# Patient Record
Sex: FEMALE | Race: WHITE | Employment: UNEMPLOYED | URBAN - NONMETROPOLITAN AREA
[De-identification: names, ages, dates, MRNs, and addresses within clinical notes are randomized per-mention and may not be internally consistent; named-entity substitution may affect disease eponyms.]

---

## 2021-05-30 ENCOUNTER — HOSPITAL ENCOUNTER (EMERGENCY)
Age: 61
Discharge: HOME | End: 2021-05-30
Attending: FAMILY MEDICINE
Payer: COMMERCIAL

## 2021-05-30 VITALS
WEIGHT: 158 LBS | RESPIRATION RATE: 16 BRPM | DIASTOLIC BLOOD PRESSURE: 80 MMHG | HEIGHT: 66 IN | HEART RATE: 95 BPM | OXYGEN SATURATION: 97 % | BODY MASS INDEX: 25.39 KG/M2 | TEMPERATURE: 98.1 F | SYSTOLIC BLOOD PRESSURE: 125 MMHG
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
## 2021-05-30 PROCEDURE — 99282 EMERGENCY DEPT VISIT SF MDM: CPT

## 2021-05-30 RX ORDER — GLUCOSAMINE SULFATE 500 MG
2 CAPSULE ORAL DAILY
COMMUNITY

## 2021-05-30 RX ORDER — M-VIT,TX,IRON,MINS/CALC/FOLIC 27MG-0.4MG
1 TABLET ORAL DAILY
COMMUNITY

## 2021-05-30 RX ORDER — ASPIRIN 81 MG/1
81 TABLET, CHEWABLE ORAL DAILY
COMMUNITY

## 2021-05-30 RX ORDER — DOXYCYCLINE 100 MG/1
100 TABLET ORAL 2 TIMES DAILY
Qty: 20 TABLET | Refills: 0 | Status: SHIPPED | OUTPATIENT
Start: 2021-05-30 | End: 2021-06-09

## 2021-05-30 ASSESSMENT — ENCOUNTER SYMPTOMS
NAUSEA: 0
COUGH: 0
SHORTNESS OF BREATH: 0
VOMITING: 0

## 2021-05-30 NOTE — ED PROVIDER NOTES
[facility]  eMERGENCY dEPARTMENT eNCOUnter          [facility]       Chief Complaint   Patient presents with    Insect Bite     Pt got bitten by a tick 1 week ago on left hip. Redness around bite is 3.2x2cm. Nurses Notes reviewed and I agree except as noted in the HPI. HISTORY OF PRESENT ILLNESS    Shanae Ferguson is a 59 y.o. female who presents presents with a tick bite that occurred about 1 week ago. The tick bit her on her left hip area. She is noted some mild redness in the involved area. Denies any extensive joint pain. Denies any fever or chills. Denies any body aches. The tick was removed about a week ago. REVIEW OF SYSTEMS     Review of Systems   Constitutional: Negative for chills and fever. Respiratory: Negative for cough and shortness of breath. Gastrointestinal: Negative for nausea and vomiting. Musculoskeletal: Negative for arthralgias and myalgias. Skin: Positive for rash (left hip area ). All other systems reviewed and are negative. PAST MEDICAL HISTORY    has no past medical history on file. SURGICAL HISTORY      has a past surgical history that includes Tubal ligation; Knee arthroscopy (Left); and cyst removal.    CURRENT MEDICATIONS       Discharge Medication List as of 5/30/2021 12:06 PM      CONTINUE these medications which have NOT CHANGED    Details   ! ! Multiple Vitamins-Minerals (THERAPEUTIC MULTIVITAMIN-MINERALS) tablet Take 1 tablet by mouth dailyHistorical Med      aspirin 81 MG chewable tablet Take 81 mg by mouth dailyHistorical Med      Glucosamine 500 MG CAPS Take 2 tablets by mouth dailyHistorical Med      !! Multiple Vitamins-Minerals (HAIR SKIN AND NAILS FORMULA PO) Take by mouth dailyHistorical Med       !! - Potential duplicate medications found. Please discuss with provider. ALLERGIES     is allergic to penicillins. FAMILY HISTORY     has no family status information on file.     family history is not on file. SOCIAL HISTORY      reports that she has never smoked. She has never used smokeless tobacco. She reports that she does not drink alcohol. PHYSICAL EXAM     INITIAL VITALS:  height is 5' 6\" (1.676 m) and weight is 158 lb (71.7 kg). Her temporal temperature is 98.1 °F (36.7 °C). Her blood pressure is 125/80 and her pulse is 95. Her respiration is 16 and oxygen saturation is 97%. Physical Exam  Vitals and nursing note reviewed. Constitutional:       General: She is not in acute distress. Cardiovascular:      Rate and Rhythm: Normal rate and regular rhythm. Musculoskeletal:         General: No tenderness. Normal range of motion. Skin:     Findings: Erythema and rash present. Comments: Circular rash about 2 cm in diameter left hip    Neurological:      General: No focal deficit present. Mental Status: She is alert and oriented to person, place, and time. Psychiatric:         Mood and Affect: Mood normal.         Behavior: Behavior normal.         DIFFERENTIAL DIAGNOSIS:   Tick bite    DIAGNOSTIC RESULTS     EKG: All EKG's are interpreted by the Emergency Department Physician who either signs or Co-signs this chart in the absence of a cardiologist.      RADIOLOGY: non-plain film images(s) such as CT, Ultrasound and MRI are read by the radiologist.      LABS:   Labs Reviewed - No data to display    EMERGENCY DEPARTMENT COURSE:   Vitals:    Vitals:    05/30/21 1146 05/30/21 1208   BP: (!) 142/96 125/80   Pulse: 95    Resp: 16    Temp: 98.1 °F (36.7 °C)    TempSrc: Temporal    SpO2: 97%    Weight: 158 lb (71.7 kg)    Height: 5' 6\" (1.676 m)      The patient is afebrile, without symptoms other than a rash to her left hip area where the tick bite occurred. It is hard to determine whether the tick was on the person for greater than 72 hours. At this time I will go ahead and prescribe doxycycline as a prophylaxis against Lyme disease.   I have asked her to follow-up with her primary care